# Patient Record
(demographics unavailable — no encounter records)

---

## 2025-03-05 NOTE — REASON FOR VISIT
[Follow - Up] : a follow-up visit [Hypothyroidism] : hypothyroidism [Osteoporosis] : osteoporosis [Menopause Symptoms] : menopause symptoms [Other___] : [unfilled]

## 2025-03-06 NOTE — ASSESSMENT
[Bisphosphonate Therapy] : Risks and benefits of bisphosphonate therapy were  discussed with the patient including gastroesophageal irritation, osteonecrosis of the jaw, and atypical femur fractures, and acute phase reaction [Bisphosphonates] : The patient was instructed to take bisphosphonates on an empty stomach with a full glass of water,and wait at least 30 minutes before eating or lying down [Levothyroxine] : The patient was instructed to take Levothyroxine on an empty stomach, separate from vitamins, and wait at least 30 minutes before eating [FreeTextEntry1] : 63 y/o female returns for a follow-up visit for Elizabeth's syndrome, hypothyroidism, osteoporosis, climacteric  Elizabeth's Syndrome Climacteric: The patient stopped estrogen replacement therapy after lumpectomy which was fortunately benign. She has developed significant hot flashes and sleep disturbance off therapy. I had initially discussed use of non-estrogen rx w/ low dose Effexor and Paxil for symptom relief. Risks and benefits discussed. All questions were answered. Pt had agreed to start but did not but she states the hot flashes are mild. Turners syndrome cardiac risks reviewed. pt does see cardiology and has had serial echo's.  Osteoporosis: Pt was previously on Fosamax followed by a drug holiday. BMD indicated lowest density in forearm only, anatomy c/w Madelung's Deformity. BMD 10/2017 indicates stable osteopenia at all sites. BMD 10/2019 indicates improved osteopenia in the spine, decreased osteopenia in fem neck but stable trochanter suggests change in fem neck is prob artifact, stable osteoporosis in prox. radius. BMD results reviewed w/pt. Pt restarted Fosamax 08/2020. Taking correctly, tolerating well. No interval fx, no UGI sx, no thigh pain. BMD 09/2021 indicates stable osteopenia in the spine and hips, stable osteoporosis in prox. radius. BMD results reviewed w/pt. BMD 11/2023 indicates decreased osteopenia in the spine, stable osteopenia in hips, stable osteoporosis in prox. radius. BMD results reviewed w/pt. BMD 03/2025 indicates stable osteopenia in the spine and total hip, decreased osteoporosis in fem neck, stable osteoporosis in prox. radius. BMD results reviewed w/pt. Pt has been on medication for 5 years and bone density is stable, recommend starting a drug holiday to decrease risk of long-term therapy side effects; ONJ or atypical femur fx. Pt agrees and elects to start drug holiday.   Primary hypothyroidism: appears clinically euthyroid on Synthroid 75 mcg daily. No local neck pain. No dysphagia or dysphonia. No raciness, shakiness, heat/cold intolerance, tiredness, or fatigue. No palpitations, tremors, or sudden weight gain/loss.  Cardiology monitoring aortic root. Dr.Evan White Patient recently started on Rosuvastatin 5mg PO Daily.  Pt is going to get bloodwork with Dr. Andrey PIKE/luann in 1 year and repeat BMD

## 2025-03-06 NOTE — PHYSICAL EXAM
[Alert] : alert [Well Nourished] : well nourished [No Acute Distress] : no acute distress [Well Developed] : well developed [Normal Sclera/Conjunctiva] : normal sclera/conjunctiva [EOMI] : extra ocular movement intact [No Proptosis] : no proptosis [Thyroid Not Enlarged] : the thyroid was not enlarged [No Thyroid Nodules] : no palpable thyroid nodules [Clear to Auscultation] : lungs were clear to auscultation bilaterally [Normal S1, S2] : normal S1 and S2 [Normal Rate] : heart rate was normal [Regular Rhythm] : with a regular rhythm [No Edema] : no peripheral edema [Normal Bowel Sounds] : normal bowel sounds [Not Tender] : non-tender [Not Distended] : not distended [Soft] : abdomen soft [Normal Anterior Cervical Nodes] : no anterior cervical lymphadenopathy [No Spinal Tenderness] : no spinal tenderness [Spine Straight] : spine straight [No Stigmata of Cushings Syndrome] : no stigmata of Cushings Syndrome [Normal Gait] : normal gait [Normal Reflexes] : deep tendon reflexes were 2+ and symmetric [No Tremors] : no tremors [Oriented x3] : oriented to person, place, and time [de-identified] : \par  madelung deformity\par  Madelung deformity wrists

## 2025-03-06 NOTE — RESULTS/DATA
[Hologic] : hologic [L1 - L4] : L1 - L4 [BMD ___ g/cm2] : BMD: [unfilled] g/cm2 [T-Score ___] : T-score: [unfilled] [FreeTextEntry2] : 8/23/2013

## 2025-03-06 NOTE — PHYSICAL EXAM
[Alert] : alert [Well Nourished] : well nourished [No Acute Distress] : no acute distress [Well Developed] : well developed [Normal Sclera/Conjunctiva] : normal sclera/conjunctiva [EOMI] : extra ocular movement intact [No Proptosis] : no proptosis [Thyroid Not Enlarged] : the thyroid was not enlarged [No Thyroid Nodules] : no palpable thyroid nodules [Clear to Auscultation] : lungs were clear to auscultation bilaterally [Normal S1, S2] : normal S1 and S2 [Normal Rate] : heart rate was normal [Regular Rhythm] : with a regular rhythm [No Edema] : no peripheral edema [Normal Bowel Sounds] : normal bowel sounds [Not Tender] : non-tender [Not Distended] : not distended [Soft] : abdomen soft [Normal Anterior Cervical Nodes] : no anterior cervical lymphadenopathy [No Spinal Tenderness] : no spinal tenderness [Spine Straight] : spine straight [No Stigmata of Cushings Syndrome] : no stigmata of Cushings Syndrome [Normal Gait] : normal gait [Normal Reflexes] : deep tendon reflexes were 2+ and symmetric [No Tremors] : no tremors [Oriented x3] : oriented to person, place, and time [de-identified] : \par  madelung deformity\par  Madelung deformity wrists

## 2025-03-06 NOTE — END OF VISIT
[FreeTextEntry3] :  This note was written by Sissy Oconnell on (March 05, 2025) acting as a medical scribe for Dr. Benavides This note was authored by the medical scribe for me. I have reviewed, edited, and revised the note as needed. I am in agreement with the exam findings, imaging findings, and treatment plan.  Pedrito Benavides MD

## 2025-03-06 NOTE — HISTORY OF PRESENT ILLNESS
[FreeTextEntry1] : Patient returns for a follow up visit for osteoporosis, hypothyroidism and Turners syndrome. Pt restarted Alendronate 08/2020. No interval surgery, hospitalizations, fractures, or change in medications. Up to date with dentist. No major dental work planned.   Pt was on low dose hormone replacement therapy Prempro 0.3/1.5. Tried stopping HRT, had severe climacteric hot flashes, restarted HRT Had breast lumpectomy fall 2019, stopped HRT. Has significant post-menopausal symptoms including hot flashes and night sweats. Up to date with routine gynecologic care and mammogram. Non-hormonal antidepressant therapy was suggested to the patient for control of climacteric symptoms but they patient was reluctant to begin this. Hot flashes reported as manageable.   Pt was previously on Fosamax followed by a drug holiday. BMD indicated lowest density in forearm only, anatomy c/w Madelung's Deformity. BMD 10/2017 indicates stable osteopenia at all sites. BMD 10/2019 indicates improved osteopenia in the spine, decreased osteopenia in fem neck but stable trochanter suggests change in fem neck is prob artifact, stable osteoporosis in prox. radius. BMD results reviewed w/pt. Pt restarted Fosamax 08/2020. Taking correctly, tolerating well. No interval fx, no UGI sx, no thigh pain. BMD 09/2021 indicates stable osteopenia in the spine and hips, stable osteoporosis in prox. radius. BMD results reviewed w/pt. BMD 11/2023 indicates decreased osteopenia in the spine, stable osteopenia in hips, stable osteoporosis in prox. radius. BMD results reviewed w/pt. BMD 03/2025 indicates stable osteopenia in the spine and total hip, decreased osteoporosis in fem neck, stable osteoporosis in prox. radius. BMD results reviewed w/pt.   Primary hypothyroidism on Synthroid 75 mcg daily. No sx of hyper or hypothyroidism. No local neck pain. No dysphagia or dysphonia. No raciness, shakiness, heat/cold intolerance, tiredness, or fatigue. No palpitations, tremors, or sudden weight gain/loss.  Being closely monitored for aortic root abnormalities, dilation with cardiology.  H/o cataract surgery.  H/o lumpectomy on left breast 2/2020.

## 2025-03-06 NOTE — ASSESSMENT
[Bisphosphonate Therapy] : Risks and benefits of bisphosphonate therapy were  discussed with the patient including gastroesophageal irritation, osteonecrosis of the jaw, and atypical femur fractures, and acute phase reaction [Bisphosphonates] : The patient was instructed to take bisphosphonates on an empty stomach with a full glass of water,and wait at least 30 minutes before eating or lying down [Levothyroxine] : The patient was instructed to take Levothyroxine on an empty stomach, separate from vitamins, and wait at least 30 minutes before eating [FreeTextEntry1] : 61 y/o female returns for a follow-up visit for Elizabeth's syndrome, hypothyroidism, osteoporosis, climacteric  Elizabeth's Syndrome Climacteric: The patient stopped estrogen replacement therapy after lumpectomy which was fortunately benign. She has developed significant hot flashes and sleep disturbance off therapy. I had initially discussed use of non-estrogen rx w/ low dose Effexor and Paxil for symptom relief. Risks and benefits discussed. All questions were answered. Pt had agreed to start but did not but she states the hot flashes are mild. Turners syndrome cardiac risks reviewed. pt does see cardiology and has had serial echo's.  Osteoporosis: Pt was previously on Fosamax followed by a drug holiday. BMD indicated lowest density in forearm only, anatomy c/w Madelung's Deformity. BMD 10/2017 indicates stable osteopenia at all sites. BMD 10/2019 indicates improved osteopenia in the spine, decreased osteopenia in fem neck but stable trochanter suggests change in fem neck is prob artifact, stable osteoporosis in prox. radius. BMD results reviewed w/pt. Pt restarted Fosamax 08/2020. Taking correctly, tolerating well. No interval fx, no UGI sx, no thigh pain. BMD 09/2021 indicates stable osteopenia in the spine and hips, stable osteoporosis in prox. radius. BMD results reviewed w/pt. BMD 11/2023 indicates decreased osteopenia in the spine, stable osteopenia in hips, stable osteoporosis in prox. radius. BMD results reviewed w/pt. BMD 03/2025 indicates stable osteopenia in the spine and total hip, decreased osteoporosis in fem neck, stable osteoporosis in prox. radius. BMD results reviewed w/pt. Pt has been on medication for 5 years and bone density is stable, recommend starting a drug holiday to decrease risk of long-term therapy side effects; ONJ or atypical femur fx. Pt agrees and elects to start drug holiday.   Primary hypothyroidism: appears clinically euthyroid on Synthroid 75 mcg daily. No local neck pain. No dysphagia or dysphonia. No raciness, shakiness, heat/cold intolerance, tiredness, or fatigue. No palpitations, tremors, or sudden weight gain/loss.  Cardiology monitoring aortic root. Dr.Evan White Patient recently started on Rosuvastatin 5mg PO Daily.  Pt is going to get bloodwork with Dr. Andrey PIKE/luann in 1 year and repeat BMD